# Patient Record
Sex: MALE | Race: WHITE | Employment: OTHER | URBAN - METROPOLITAN AREA
[De-identification: names, ages, dates, MRNs, and addresses within clinical notes are randomized per-mention and may not be internally consistent; named-entity substitution may affect disease eponyms.]

---

## 2020-01-01 ENCOUNTER — APPOINTMENT (OUTPATIENT)
Dept: GENERAL RADIOLOGY | Age: 75
End: 2020-01-01
Payer: MEDICARE

## 2020-01-01 ENCOUNTER — HOSPITAL ENCOUNTER (EMERGENCY)
Age: 75
End: 2020-11-13
Attending: EMERGENCY MEDICINE
Payer: MEDICARE

## 2020-01-01 VITALS
TEMPERATURE: 92.3 F | RESPIRATION RATE: 16 BRPM | DIASTOLIC BLOOD PRESSURE: 71 MMHG | SYSTOLIC BLOOD PRESSURE: 116 MMHG | OXYGEN SATURATION: 92 % | HEART RATE: 87 BPM

## 2020-01-01 LAB
-: NORMAL
ABO/RH: NORMAL
ALBUMIN SERPL-MCNC: 2.5 G/DL (ref 3.5–5.2)
ALBUMIN/GLOBULIN RATIO: 1.2 (ref 1–2.5)
ALLEN TEST: ABNORMAL
ALP BLD-CCNC: 707 U/L (ref 40–129)
ALT SERPL-CCNC: 655 U/L (ref 5–41)
AMORPHOUS: NORMAL
ANION GAP SERPL CALCULATED.3IONS-SCNC: 28 MMOL/L (ref 9–17)
ANION GAP: 19 MMOL/L (ref 7–16)
ANTIBODY SCREEN: NEGATIVE
ARM BAND NUMBER: NORMAL
AST SERPL-CCNC: 909 U/L
BACTERIA: NORMAL
BILIRUB SERPL-MCNC: 3.37 MG/DL (ref 0.3–1.2)
BILIRUBIN URINE: ABNORMAL
BNP INTERPRETATION: ABNORMAL
BUN BLDV-MCNC: 82 MG/DL (ref 8–23)
BUN/CREAT BLD: ABNORMAL (ref 9–20)
CALCIUM SERPL-MCNC: 8 MG/DL (ref 8.6–10.4)
CASTS UA: NORMAL /LPF (ref 0–8)
CHLORIDE BLD-SCNC: 99 MMOL/L (ref 98–107)
CO2: 7 MMOL/L (ref 20–31)
COLOR: ABNORMAL
COMMENT UA: ABNORMAL
CREAT SERPL-MCNC: 2.42 MG/DL (ref 0.7–1.2)
CRYSTALS, UA: NORMAL /HPF
CULTURE: NO GROWTH
EKG ATRIAL RATE: 87 BPM
EKG ATRIAL RATE: 99 BPM
EKG P AXIS: 26 DEGREES
EKG P AXIS: 72 DEGREES
EKG P-R INTERVAL: 176 MS
EKG P-R INTERVAL: 242 MS
EKG Q-T INTERVAL: 408 MS
EKG Q-T INTERVAL: 498 MS
EKG QRS DURATION: 162 MS
EKG QRS DURATION: 96 MS
EKG QTC CALCULATION (BAZETT): 490 MS
EKG QTC CALCULATION (BAZETT): 639 MS
EKG R AXIS: -18 DEGREES
EKG R AXIS: -46 DEGREES
EKG T AXIS: 25 DEGREES
EKG T AXIS: 51 DEGREES
EKG VENTRICULAR RATE: 87 BPM
EKG VENTRICULAR RATE: 99 BPM
EPITHELIAL CELLS UA: NORMAL /HPF (ref 0–5)
EXPIRATION DATE: NORMAL
FIO2: ABNORMAL
GFR AFRICAN AMERICAN: 32 ML/MIN
GFR NON-AFRICAN AMERICAN: 25 ML/MIN
GFR NON-AFRICAN AMERICAN: 26 ML/MIN
GFR SERPL CREATININE-BSD FRML MDRD: 30 ML/MIN
GFR SERPL CREATININE-BSD FRML MDRD: ABNORMAL ML/MIN/{1.73_M2}
GLUCOSE BLD-MCNC: 111 MG/DL (ref 74–100)
GLUCOSE BLD-MCNC: 120 MG/DL (ref 70–99)
GLUCOSE URINE: NEGATIVE
HCO3 VENOUS: 17.3 MMOL/L (ref 22–29)
HCT VFR BLD CALC: 39.6 % (ref 40.7–50.3)
HEMOGLOBIN: 11.1 G/DL (ref 13–17)
INR BLD: 1.5
KETONES, URINE: NEGATIVE
LACTIC ACID, WHOLE BLOOD: 19 MMOL/L (ref 0.7–2.1)
LEUKOCYTE ESTERASE, URINE: NEGATIVE
Lab: NORMAL
MCH RBC QN AUTO: 31 PG (ref 25.2–33.5)
MCHC RBC AUTO-ENTMCNC: 28 G/DL (ref 28.4–34.8)
MCV RBC AUTO: 110.6 FL (ref 82.6–102.9)
MODE: ABNORMAL
MUCUS: NORMAL
NEGATIVE BASE EXCESS, VEN: 21 (ref 0–2)
NITRITE, URINE: NEGATIVE
NRBC AUTOMATED: 2.4 PER 100 WBC
O2 DEVICE/FLOW/%: ABNORMAL
O2 SAT, VEN: 80 % (ref 60–85)
OTHER OBSERVATIONS UA: NORMAL
PARTIAL THROMBOPLASTIN TIME: 93.3 SEC (ref 20.5–30.5)
PATIENT TEMP: ABNORMAL
PCO2, VEN: 140.4 MM HG (ref 41–51)
PDW BLD-RTO: 16.9 % (ref 11.8–14.4)
PH UA: 5 (ref 5–8)
PH VENOUS: 6.7 (ref 7.32–7.43)
PLATELET # BLD: 150 K/UL (ref 138–453)
PMV BLD AUTO: 12.1 FL (ref 8.1–13.5)
PO2, VEN: 94.9 MM HG (ref 30–50)
POC CHLORIDE: 104 MMOL/L (ref 98–107)
POC CREATININE: 2.52 MG/DL (ref 0.51–1.19)
POC HEMATOCRIT: 35 % (ref 41–53)
POC HEMOGLOBIN: 11.8 G/DL (ref 13.5–17.5)
POC IONIZED CALCIUM: 1.56 MMOL/L (ref 1.15–1.33)
POC LACTIC ACID: 19.95 MMOL/L (ref 0.56–1.39)
POC PCO2 TEMP: ABNORMAL MM HG
POC PH TEMP: ABNORMAL
POC PO2 TEMP: ABNORMAL MM HG
POC POTASSIUM: 6.1 MMOL/L (ref 3.5–4.5)
POC SODIUM: 140 MMOL/L (ref 138–146)
POSITIVE BASE EXCESS, VEN: ABNORMAL (ref 0–3)
POTASSIUM SERPL-SCNC: 6 MMOL/L (ref 3.7–5.3)
PRO-BNP: 3834 PG/ML
PROTEIN UA: ABNORMAL
PROTHROMBIN TIME: 15.7 SEC (ref 9–12)
RBC # BLD: 3.58 M/UL (ref 4.21–5.77)
RBC UA: NORMAL /HPF (ref 0–4)
RENAL EPITHELIAL, UA: NORMAL /HPF
SAMPLE SITE: ABNORMAL
SARS-COV-2, RAPID: NOT DETECTED
SARS-COV-2: NORMAL
SARS-COV-2: NORMAL
SODIUM BLD-SCNC: 134 MMOL/L (ref 135–144)
SOURCE: NORMAL
SPECIFIC GRAVITY UA: 1.02 (ref 1–1.03)
SPECIMEN DESCRIPTION: NORMAL
THYROXINE, FREE: 0.44 NG/DL (ref 0.93–1.7)
TOTAL CO2, VENOUS: 22 MMOL/L (ref 23–30)
TOTAL PROTEIN: 4.6 G/DL (ref 6.4–8.3)
TRICHOMONAS: NORMAL
TROPONIN INTERP: ABNORMAL
TROPONIN T: ABNORMAL NG/ML
TROPONIN, HIGH SENSITIVITY: 42 NG/L (ref 0–22)
TSH SERPL DL<=0.05 MIU/L-ACNC: 7.52 MIU/L (ref 0.3–5)
TURBIDITY: ABNORMAL
URINE HGB: ABNORMAL
UROBILINOGEN, URINE: NORMAL
WBC # BLD: 6.8 K/UL (ref 3.5–11.3)
WBC UA: NORMAL /HPF (ref 0–5)
YEAST: NORMAL

## 2020-01-01 PROCEDURE — 86901 BLOOD TYPING SEROLOGIC RH(D): CPT

## 2020-01-01 PROCEDURE — 87086 URINE CULTURE/COLONY COUNT: CPT

## 2020-01-01 PROCEDURE — 83605 ASSAY OF LACTIC ACID: CPT

## 2020-01-01 PROCEDURE — 6360000002 HC RX W HCPCS: Performed by: STUDENT IN AN ORGANIZED HEALTH CARE EDUCATION/TRAINING PROGRAM

## 2020-01-01 PROCEDURE — 94761 N-INVAS EAR/PLS OXIMETRY MLT: CPT

## 2020-01-01 PROCEDURE — 96374 THER/PROPH/DIAG INJ IV PUSH: CPT

## 2020-01-01 PROCEDURE — 86900 BLOOD TYPING SEROLOGIC ABO: CPT

## 2020-01-01 PROCEDURE — 81001 URINALYSIS AUTO W/SCOPE: CPT

## 2020-01-01 PROCEDURE — 84484 ASSAY OF TROPONIN QUANT: CPT

## 2020-01-01 PROCEDURE — 82803 BLOOD GASES ANY COMBINATION: CPT

## 2020-01-01 PROCEDURE — 85730 THROMBOPLASTIN TIME PARTIAL: CPT

## 2020-01-01 PROCEDURE — 96375 TX/PRO/DX INJ NEW DRUG ADDON: CPT

## 2020-01-01 PROCEDURE — 83880 ASSAY OF NATRIURETIC PEPTIDE: CPT

## 2020-01-01 PROCEDURE — 2580000003 HC RX 258

## 2020-01-01 PROCEDURE — 93010 ELECTROCARDIOGRAM REPORT: CPT | Performed by: INTERNAL MEDICINE

## 2020-01-01 PROCEDURE — 84443 ASSAY THYROID STIM HORMONE: CPT

## 2020-01-01 PROCEDURE — 82565 ASSAY OF CREATININE: CPT

## 2020-01-01 PROCEDURE — 94770 HC ETCO2 MONITOR DAILY: CPT

## 2020-01-01 PROCEDURE — 99283 EMERGENCY DEPT VISIT LOW MDM: CPT

## 2020-01-01 PROCEDURE — 85027 COMPLETE CBC AUTOMATED: CPT

## 2020-01-01 PROCEDURE — U0002 COVID-19 LAB TEST NON-CDC: HCPCS

## 2020-01-01 PROCEDURE — 93005 ELECTROCARDIOGRAM TRACING: CPT | Performed by: STUDENT IN AN ORGANIZED HEALTH CARE EDUCATION/TRAINING PROGRAM

## 2020-01-01 PROCEDURE — 84132 ASSAY OF SERUM POTASSIUM: CPT

## 2020-01-01 PROCEDURE — 84439 ASSAY OF FREE THYROXINE: CPT

## 2020-01-01 PROCEDURE — 2500000003 HC RX 250 WO HCPCS: Performed by: STUDENT IN AN ORGANIZED HEALTH CARE EDUCATION/TRAINING PROGRAM

## 2020-01-01 PROCEDURE — 84295 ASSAY OF SERUM SODIUM: CPT

## 2020-01-01 PROCEDURE — 80053 COMPREHEN METABOLIC PANEL: CPT

## 2020-01-01 PROCEDURE — 2700000000 HC OXYGEN THERAPY PER DAY

## 2020-01-01 PROCEDURE — 92950 HEART/LUNG RESUSCITATION CPR: CPT

## 2020-01-01 PROCEDURE — 82947 ASSAY GLUCOSE BLOOD QUANT: CPT

## 2020-01-01 PROCEDURE — 82330 ASSAY OF CALCIUM: CPT

## 2020-01-01 PROCEDURE — 82435 ASSAY OF BLOOD CHLORIDE: CPT

## 2020-01-01 PROCEDURE — 2500000003 HC RX 250 WO HCPCS

## 2020-01-01 PROCEDURE — 6360000002 HC RX W HCPCS

## 2020-01-01 PROCEDURE — 85610 PROTHROMBIN TIME: CPT

## 2020-01-01 PROCEDURE — 71045 X-RAY EXAM CHEST 1 VIEW: CPT

## 2020-01-01 PROCEDURE — 86850 RBC ANTIBODY SCREEN: CPT

## 2020-01-01 PROCEDURE — 85014 HEMATOCRIT: CPT

## 2020-01-01 RX ORDER — MORPHINE SULFATE 4 MG/ML
2 INJECTION, SOLUTION INTRAMUSCULAR; INTRAVENOUS
Status: DISCONTINUED | OUTPATIENT
Start: 2020-01-01 | End: 2020-01-01 | Stop reason: HOSPADM

## 2020-01-01 RX ORDER — GLYCOPYRROLATE 0.2 MG/ML
0.2 INJECTION INTRAMUSCULAR; INTRAVENOUS EVERY 4 HOURS PRN
Status: DISCONTINUED | OUTPATIENT
Start: 2020-01-01 | End: 2020-01-01 | Stop reason: HOSPADM

## 2020-01-01 RX ORDER — LORAZEPAM 2 MG/ML
0.5 INJECTION INTRAMUSCULAR
Status: DISCONTINUED | OUTPATIENT
Start: 2020-01-01 | End: 2020-01-01 | Stop reason: HOSPADM

## 2020-01-01 RX ADMIN — GLYCOPYRROLATE 0.2 MG: 0.2 INJECTION INTRAMUSCULAR; INTRAVENOUS at 02:53

## 2020-01-01 RX ADMIN — LORAZEPAM 0.5 MG: 2 INJECTION INTRAMUSCULAR; INTRAVENOUS at 02:54

## 2020-01-01 RX ADMIN — MORPHINE SULFATE 2 MG: 4 INJECTION INTRAVENOUS at 02:52

## 2020-01-01 ASSESSMENT — PULMONARY FUNCTION TESTS: PIF_VALUE: 38

## 2020-01-01 ASSESSMENT — PAIN SCALES - GENERAL: PAINLEVEL_OUTOF10: 0

## 2020-11-13 NOTE — ED TRIAGE NOTES
Patient arrived to ED via [facility] device on doing compressions as patient came to room 12. EMS reports that patient was found down in recliner chair, family contacted 911. PD started CPR and got pulses back- during transport to facility patient lost pulses and CPR was resumed. EMS reports patient was recently diagnosed with stage 4 liver cancer.  Patient is intubated when came into ED- Respiratory, Dr. Daphne Toney, Dr. Dayday Mayorga and staff at bedside

## 2020-11-13 NOTE — ED NOTES
Patients son at bedside-   Dr. Daphne Toney is updating on plan of care.          Tiffany Ruiz RN  11/13/20 0613

## 2020-11-13 NOTE — ED PROVIDER NOTES
file   Lifestyle    Physical activity     Days per week: Not on file     Minutes per session: Not on file    Stress: Not on file   Relationships    Social connections     Talks on phone: Not on file     Gets together: Not on file     Attends [employer] service: Not on file     Active member of club or organization: Not on file     Attends meetings of clubs or organizations: Not on file     Relationship status: Not on file    Intimate partner violence     Fear of current or ex partner: Not on file     Emotionally abused: Not on file     Physically abused: Not on file     Forced sexual activity: Not on file   Other Topics Concern    Not on file   Social History Narrative    Not on file       No family history on file. Allergies:  Patient has no known allergies. Home Medications:  Prior to Admission medications    Not on File       REVIEW OF SYSTEMS    (2-9 systems for level 4, 10 or more for level 5)      Unable due to patient condition    PHYSICAL EXAM   (up to 7 for level 4, 8 or more for level 5)      INITIAL VITALS:   /71   Pulse 87   Temp 92.3 °F (33.5 °C) (Core)   Resp 16   SpO2 92%     After return of circulation and physical exam as follows:    Patient intubated off sedation pupils fixed and dilated    HEENT: No trauma to the head pupils 5 mm not reactive, 7.0 ET tube 25 cm lower lip    Equal chest rise bilateral rhonchorous but equal breath sounds, heart regular rate and rhythm no murmur.   Strong femoral pulse, weak radial pulse, extremities cool    Abdomen distended, umbilical hernia present    No ice trauma to the extremities, humeral IO in place, left AC 18-gauge in place    Neuro: Pupils fixed and dilated, there is no corneal reflex, there is no cough or gag, patient not breathing over ventilator, no reaction to painful stimuli in all extremities      DIFFERENTIAL  DIAGNOSIS     PLAN (LABS / IMAGING / EKG):  Orders Placed This Encounter   Procedures    Culture, Urine    XR CHEST PORTABLE    COVID-19    Urinalysis Reflex to Culture    Brain Natriuretic Peptide    CBC    Comprehensive Metabolic Panel    Lactic Acid, Whole Blood    Protime-INR    APTT    Troponin    TSH with Reflex    Microscopic Urinalysis    T4, Free    Preliminary cause of death    DNR comfort care    Inpatient consult to [facility]    Extubation    POC Blood Gas and Chemistry    EKG 12 Lead    EKG 12 Lead    TYPE AND SCREEN       MEDICATIONS ORDERED:  Orders Placed This Encounter   Medications    sodium bicarbonate 8.4 % injection     SIERRA LARSON: cabinet override    sodium bicarbonate 150 mEq in dextrose 5 % 1,000 mL infusion    morphine injection 2 mg    LORazepam (ATIVAN) injection 0.5 mg    glycopyrrolate injection 0.2 mg           DIAGNOSTIC RESULTS / EMERGENCY DEPARTMENT COURSE / MDM     LABS:  Results for orders placed or performed during the hospital encounter of 11/13/20   COVID-19    Specimen: Other   Result Value Ref Range    SARS-CoV-2          SARS-CoV-2, Rapid Not Detected Not Detected    Source . NASOPHARYNGEAL SWAB     SARS-CoV-2         Urinalysis Reflex to Culture    Specimen: Urine, clean catch   Result Value Ref Range    Color, UA DARK YELLOW (A) YELLOW    Turbidity UA TURBID (A) CLEAR    Glucose, Ur NEGATIVE NEGATIVE    Bilirubin Urine NEGATIVE  Verified by ictotest. (A) NEGATIVE    Ketones, Urine NEGATIVE NEGATIVE    Specific Gravity, UA 1.023 1.005 - 1.030    Urine Hgb TRACE (A) NEGATIVE    pH, UA 5.0 5.0 - 8.0    Protein, UA 2+ (A) NEGATIVE    Urobilinogen, Urine Normal Normal    Nitrite, Urine NEGATIVE NEGATIVE    Leukocyte Esterase, Urine NEGATIVE NEGATIVE    Urinalysis Comments NOT REPORTED    Brain Natriuretic Peptide   Result Value Ref Range    Pro-BNP 3,834 (H) <300 pg/mL    BNP Interpretation Pro-BNP Reference Range:    CBC   Result Value Ref Range    WBC 6.8 3.5 - 11.3 k/uL    RBC 3.58 (L) 4.21 - 5.77 m/uL    Hemoglobin 11.1 (L) 13.0 - 17.0 g/dL    Hematocrit 39.6 (L) 40.7 - 50.3 %    .6 (H) 82.6 - 102.9 fL    MCH 31.0 25.2 - 33.5 pg    MCHC 28.0 (L) 28.4 - 34.8 g/dL    RDW 16.9 (H) 11.8 - 14.4 %    Platelets 094 267 - 403 k/uL    MPV 12.1 8.1 - 13.5 fL    NRBC Automated 2.4 (H) 0.0 per 100 WBC   Comprehensive Metabolic Panel   Result Value Ref Range    Glucose 120 (H) 70 - 99 mg/dL    BUN 82 (H) 8 - 23 mg/dL    CREATININE 2.42 (H) 0.70 - 1.20 mg/dL    Bun/Cre Ratio NOT REPORTED 9 - 20    Calcium 8.0 (L) 8.6 - 10.4 mg/dL    Sodium 134 (L) 135 - 144 mmol/L    Potassium 6.0 (HH) 3.7 - 5.3 mmol/L    Chloride 99 98 - 107 mmol/L    CO2 7 (LL) 20 - 31 mmol/L    Anion Gap 28 (H) 9 - 17 mmol/L    Alkaline Phosphatase 707 (H) 40 - 129 U/L     (H) 5 - 41 U/L     (H) <40 U/L    Total Bilirubin 3.37 (H) 0.3 - 1.2 mg/dL    Total Protein 4.6 (L) 6.4 - 8.3 g/dL    Alb 2.5 (L) 3.5 - 5.2 g/dL    Albumin/Globulin Ratio 1.2 1.0 - 2.5    GFR Non-African American 26 (L) >60 mL/min    GFR  32 (L) >60 mL/min    GFR Comment          GFR Staging NOT REPORTED    Lactic Acid, Whole Blood   Result Value Ref Range    Lactic Acid, Whole Blood 19.0 (H) 0.7 - 2.1 mmol/L   Protime-INR   Result Value Ref Range    Protime 15.7 (H) 9.0 - 12.0 sec    INR 1.5    APTT   Result Value Ref Range    PTT 93.3 (HH) 20.5 - 30.5 sec   Troponin   Result Value Ref Range    Troponin, High Sensitivity 42 (H) 0 - 22 ng/L    Troponin T NOT REPORTED <0.03 ng/mL    Troponin Interp NOT REPORTED    TSH with Reflex   Result Value Ref Range    TSH 7.52 (H) 0.30 - 5.00 mIU/L   Microscopic Urinalysis   Result Value Ref Range    -          WBC, UA 50  0 - 5 /HPF    RBC, UA 5 TO 10 0 - 4 /HPF    Casts UA NOT REPORTED 0 - 8 /LPF    Crystals, UA NOT REPORTED None /HPF    Epithelial Cells UA 50  0 - 5 /HPF    Renal Epithelial, UA NOT REPORTED 0 /HPF    Bacteria, UA NOT REPORTED None    Mucus, UA NOT REPORTED None    Trichomonas, UA NOT REPORTED None    Amorphous, UA NOT REPORTED None Other Observations UA NOT REPORTED NOT REQ. Yeast, UA NOT REPORTED None   T4, Free   Result Value Ref Range    Thyroxine, Free 0.44 (L) 0.93 - 1.70 ng/dL   TYPE AND SCREEN   Result Value Ref Range    Expiration Date 11/16/2020,2359     Arm Band Number [id number]     ABO/Rh O POSITIVE     Antibody Screen NEGATIVE            RADIOLOGY:  No results found. EKG  None    All EKG's are interpreted by the Emergency Department Physician who either signs or Co-signs this chart in the absence of a cardiologist.    EMERGENCY DEPARTMENT COURSE/IMPRESSION:    Patient arrives in cardiac arrest, ACLS performed return of circulation, patient required push dose epinephrine, wide-complex rhythm on EKG, immediate concern would be hyperkalemia, patient was given 3 Amps of bicarb, 1 g of calcium chloride, 2 g magnesium    EKG showed no STEMI pattern, rush exam showed no pericardial to benign bilateral lung sliding, aorta appeared to be normal caliber, there was no signs of right heart strain to include absentee D sign.   Temperature 33.5 via bladder Adamson    Patient significantly acidotic, blood pressure has been labile, will continue to push dose epinephrine at the bedside however pressure appears to be stabilized after initial 20 mcg of epinephrine, bicarb drip made at bedside will write 150 mill equivalents an hour,    We will get point-of-care labs, start on bicarb drip as patient significantly acidotic, increased ventilatory rate to 20 as CO2 was over 100 on point-of-care VBG, sepsis labs, CT head, CT PE study CT abdomen pelvis, expect ICU admit, will discuss goals of care with family based on poor neurologic status likely anoxic brain injury    Reported downtime approximately 5 to 10 minutes prior to CPR being started by police, it was a witnessed arrest, only PEA rhythm prior to arrival        ED Course as of Nov 13 0559 Fri Nov 13, 2020   0242 Patient's son has arrived who is power of , he witnessed the arrest states

## 2020-11-13 NOTE — ED NOTES
Pt patches and monitoring removed. Post mortem care provided. Pt prepared for family to see.       Brice Kaiser RN  11/13/20 7086

## 2020-11-13 NOTE — ED NOTES
Patient extubated by Respiratory Therapist- Dr. Daphne Toney at bedside  Patient medicated for comfort prior to extubation         Tiffany Ruiz RN  11/13/20 5665

## 2020-11-13 NOTE — ED PROVIDER NOTES
[facility] ED  Emergency Department  Faculty Attestation       I performed a history and physical examination of the patient and discussed management with the resident. I reviewed the residents note and agree with the documented findings including all diagnostic interpretations and plan of care. Any areas of disagreement are noted on the chart. I was personally present for the key portions of any procedures. I have documented in the chart those procedures where I was not present during the key portions. I have reviewed the emergency nurses triage note. I agree with the chief complaint, past medical history, past surgical history, allergies, medications, social and family history as documented unless otherwise noted below. Documentation of the HPI, Physical Exam and Medical Decision Making performed by amishibandrew is based on my personal performance of the HPI, PE and MDM. For Physician Assistant/ Nurse Practitioner cases/documentation I have personally evaluated this patient and have completed at least one if not all key elements of the E/M (history, physical exam, and MDM). Additional findings are as noted. Pertinent Comments     Primary Care Physician: Vianey Barragan MD    ED Triage Vitals   BP Temp Temp Source Pulse Resp SpO2 Height Weight   11/13/20 0139 11/13/20 0205 11/13/20 0205 11/13/20 0148 11/13/20 0148 11/13/20 0148 -- --   (!) 155/90 92.3 °F (33.5 °C) CORE 91 18 (!) 88 %          This is a 76 y.o. male who presents to the Emergency Department with complaint of cardiac arrest.  Patient has known liver and lung cancer. Had first chemo. Vomiting went unresponsive for family. Had 5 minutes of CPR per police, and then had ROSC. He then had recurrent loss of pulses with EMS in PEA has been coding for proximally 15 minutes. On exam, patient is off any sedation. Has no pupillary response. Intubated with good air entry bilaterally, but there is diffuse crackles.   Abdominal distention. Off of sedation with no reflexes or response to pain. MDM/Plan: Cardiac arrest.  Prior to arrival he received multiple rounds of epinephrine and 1 bicarb. He was in PEA when he arrived to get other additional epi. We did get ROSC. Patient received bicarb, calcium, and magnesium. Patient is still unresponsive off of sedation. Resident had conversation with family member, no further efforts to be done. Patient was terminally extubated. CRITICAL CARE: There was significant risk of life threatening deterioration of patient's condition requiring my direct management. Critical care time 30 minutes, excluding any documented procedures.       Dafne Miramontes MD  Attending Emergency Physician         Dafne Miramontes MD  11/13/20 5686

## 2020-11-13 NOTE — ED NOTES
1.5 amps of bicarb mixed in 500ml D5 being administered at 150ml/hour     Bettina Wlikerson RN  11/13/20 4809

## 2020-11-13 NOTE — ED NOTES
Dr. Maritza Murillo at bedside- Patient has no respirations or pulse.   Time of Death 17[address] Dr Tom Philip [facility], [facility]0 [facility]  11/13/20 1732

## 2020-11-13 NOTE — ED NOTES
20mcg of push dose epi given by Dr. Clara Jason, [address] [place] [address]  11/13/20 2712

## 2020-11-13 NOTE — PROGRESS NOTES
11/13/20 0212   Vent Information   Vt Ordered 580 mL   Rate Set 16 bmp   FiO2  100 %   PEEP/CPAP 8   ETT (adult)   Placement Date/Time: 11/13/20 0115   Type: Cuffed  Tube Size: 7 mm  Location: Oral  Secured at: 26 cm  Placed By: In place on arrival to facility  Measured From: Lips   Secured at 26 cm     Pt here via EMS, post arrest with ETT in place at charted settings. Placed on chart settings after pt had ROSC. CXR and ABG to be obtained.

## 2020-11-13 NOTE — ED NOTES
Pulse check completed- Patient has pulses CPR paused  Dr. Liana Souza and Dr. Usama Fontana at bedside as well as respiratory           [facility]  11/13/20 6613

## 2020-11-13 NOTE — ED NOTES
1 amp bicarb given IV push by [facility]     Carissa Galo, SHAUNNA  11/13/20 5788

## 2020-11-13 NOTE — PROGRESS NOTES
Patient extubated per physician order. Patient extubated in usual fashion. Patient placed on  room air.      Cecilia Erickson   3:32 AM